# Patient Record
Sex: FEMALE | Race: WHITE | NOT HISPANIC OR LATINO | Employment: OTHER | URBAN - METROPOLITAN AREA
[De-identification: names, ages, dates, MRNs, and addresses within clinical notes are randomized per-mention and may not be internally consistent; named-entity substitution may affect disease eponyms.]

---

## 2017-05-25 ENCOUNTER — PREPPED CHART (OUTPATIENT)
Dept: URBAN - METROPOLITAN AREA CLINIC 36 | Facility: CLINIC | Age: 62
End: 2017-05-25

## 2017-08-14 NOTE — PATIENT DISCUSSION
BLEPHARITIS, OU: IMPROVING, INCREASE WARM COMPRESSES DAILY AND EYELID SCRUBS QD-BID, ARTIFICIAL TEARS BID-QID, THE DAILY INTAKE OF OMEGA-3 FATTY ACIDS .

## 2017-08-14 NOTE — PATIENT DISCUSSION
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 The resulting symptoms, including but not limited to visual field deficits and blepharitis. The patient understands that intervention is elective surgery and consultation with an oculoplastic specialist can be scheduled at their convenience.

## 2018-05-10 ASSESSMENT — VISUAL ACUITY
OD_CC: 20/20
OD_SC: J10
OS_CC: J1
OD_SC: CF 6FT
OS_SC: J10
OS_SC: 20/400
OS_CC: 20/20-2
OD_CC: J1

## 2018-05-10 ASSESSMENT — TONOMETRY
OS_IOP_MMHG: 18
OD_IOP_MMHG: 18

## 2018-06-25 ENCOUNTER — ESTABLISHED COMPREHENSIVE EXAM (OUTPATIENT)
Dept: URBAN - METROPOLITAN AREA CLINIC 36 | Facility: CLINIC | Age: 63
End: 2018-06-25

## 2018-06-25 DIAGNOSIS — H52.7: ICD-10-CM

## 2018-06-25 PROCEDURE — 92015 DETERMINE REFRACTIVE STATE: CPT

## 2018-06-25 PROCEDURE — 92014 COMPRE OPH EXAM EST PT 1/>: CPT

## 2018-06-25 ASSESSMENT — VISUAL ACUITY
OS_SC: 20/400
OS_CC: 20/20
OD_CC: J2
OD_CC: 20/20-1
OD_SC: <J10
OS_CC: J2
OS_SC: <J10
OD_SC: 20/400

## 2018-06-25 ASSESSMENT — TONOMETRY
OD_IOP_MMHG: 12
OS_IOP_MMHG: 16

## 2018-08-17 NOTE — PATIENT DISCUSSION
POSTERIOR VITREOUS DETACHMENT, OU: NO RETINAL HOLES OR TEARS ON SLIT LAMP AND INDIRECT OPHTHALMOSCOPY. RD PRECAUTIONS DISCUSSED. CALLBACK INSTRUCTIONS GIVEN. RETURN FOR FOLLOW-UP AS SCHEDULED.

## 2018-08-17 NOTE — PATIENT DISCUSSION
BLEPHARITIS, OU: IMPROVING, INCREASE WARM COMPRESSES DAILY AND EYELID SCRUBS QD-BID, ARTIFICIAL TEARS BID-QID, THE DAILY INTAKE OF OMEGA-3 FATTY ACIDS . PRESCRIBED EMYCIN THAI QHS PRN.

## 2018-08-17 NOTE — PATIENT DISCUSSION
New Prescription: erythromycin (erythromycin): ointment: 5 mg/gram (0.5 %) 1 a thin layer at bedtime into both eyes 08-

## 2019-07-03 ENCOUNTER — ESTABLISHED COMPREHENSIVE EXAM (OUTPATIENT)
Dept: URBAN - METROPOLITAN AREA CLINIC 36 | Facility: CLINIC | Age: 64
End: 2019-07-03

## 2019-07-03 DIAGNOSIS — H25.811: ICD-10-CM

## 2019-07-03 DIAGNOSIS — H52.7: ICD-10-CM

## 2019-07-03 DIAGNOSIS — H25.812: ICD-10-CM

## 2019-07-03 PROCEDURE — 92015 DETERMINE REFRACTIVE STATE: CPT

## 2019-07-03 PROCEDURE — 92014 COMPRE OPH EXAM EST PT 1/>: CPT

## 2019-07-03 ASSESSMENT — VISUAL ACUITY
OS_SC: >20/400
OD_CC: J1
OS_CC: J1
OD_SC: J2
OD_SC: CF 6FT
OD_CC: 20/25+1
OS_CC: 20/20-1
OS_SC: J2

## 2019-07-03 ASSESSMENT — TONOMETRY
OS_IOP_MMHG: 14
OD_IOP_MMHG: 12

## 2019-09-17 NOTE — PATIENT DISCUSSION
Continue: erythromycin (erythromycin): ointment: 5 mg/gram (0.5 %) 1 a thin layer at bedtime into both eyes 08-

## 2020-05-13 ENCOUNTER — ESTABLISHED COMPREHENSIVE EXAM (OUTPATIENT)
Dept: URBAN - METROPOLITAN AREA CLINIC 36 | Facility: CLINIC | Age: 65
End: 2020-05-13

## 2020-05-13 DIAGNOSIS — H25.812: ICD-10-CM

## 2020-05-13 DIAGNOSIS — H35.3131: ICD-10-CM

## 2020-05-13 DIAGNOSIS — H52.7: ICD-10-CM

## 2020-05-13 DIAGNOSIS — H25.811: ICD-10-CM

## 2020-05-13 PROCEDURE — 92014 COMPRE OPH EXAM EST PT 1/>: CPT

## 2020-05-13 PROCEDURE — 92015 DETERMINE REFRACTIVE STATE: CPT

## 2020-05-13 ASSESSMENT — TONOMETRY
OD_IOP_MMHG: 14
OS_IOP_MMHG: 14

## 2020-05-13 ASSESSMENT — VISUAL ACUITY
OS_SC: 20/400
OD_SC: J8
OD_CC: 20/20-2
OS_CC: 20/20
OD_SC: CF 6FT
OS_SC: J8
OD_CC: J3
OS_CC: J2

## 2020-09-11 NOTE — PATIENT DISCUSSION
BLEPHARITIS, OU: IMPROVING, INCREASE WARM COMPRESSES DAILY AND EYELID SCRUBS QD-BID, ARTIFICIAL TEARS BID-QID, THE DAILY INTAKE OF OMEGA-3 FATTY ACIDS . ADD EMYCIN THAI QHS.

## 2020-09-11 NOTE — PATIENT DISCUSSION
Stopped Today: erythromycin (erythromycin): ointment: 5 mg/gram (0.5 %) 1 a thin layer at bedtime into both eyes 08-

## 2021-01-05 ENCOUNTER — EST. PATIENT EMERGENCY (OUTPATIENT)
Dept: URBAN - METROPOLITAN AREA CLINIC 36 | Facility: CLINIC | Age: 66
End: 2021-01-05

## 2021-01-05 DIAGNOSIS — H57.89: ICD-10-CM

## 2021-01-05 DIAGNOSIS — H10.503: ICD-10-CM

## 2021-01-05 PROCEDURE — 92012 INTRM OPH EXAM EST PATIENT: CPT

## 2021-01-05 RX ORDER — NEOMYCIN SULFATE, POLYMYXIN B SULFATE AND DEXAMETHASONE 3.5; 10000; 1 MG/ML; [USP'U]/ML; MG/ML: 1 SUSPENSION OPHTHALMIC

## 2021-01-05 ASSESSMENT — VISUAL ACUITY
OD_CC: 20/20-1
OS_CC: 20/20-1

## 2021-01-05 ASSESSMENT — TONOMETRY
OD_IOP_MMHG: 13
OS_IOP_MMHG: 11

## 2021-05-18 ENCOUNTER — ESTABLISHED COMPREHENSIVE EXAM (OUTPATIENT)
Dept: URBAN - METROPOLITAN AREA CLINIC 36 | Facility: CLINIC | Age: 66
End: 2021-05-18

## 2021-05-18 DIAGNOSIS — H25.811: ICD-10-CM

## 2021-05-18 DIAGNOSIS — Z97.3: ICD-10-CM

## 2021-05-18 DIAGNOSIS — H04.123: ICD-10-CM

## 2021-05-18 DIAGNOSIS — H35.372: ICD-10-CM

## 2021-05-18 DIAGNOSIS — H52.7: ICD-10-CM

## 2021-05-18 DIAGNOSIS — H35.3131: ICD-10-CM

## 2021-05-18 DIAGNOSIS — H25.812: ICD-10-CM

## 2021-05-18 PROCEDURE — 92015 DETERMINE REFRACTIVE STATE: CPT

## 2021-05-18 PROCEDURE — 92014 COMPRE OPH EXAM EST PT 1/>: CPT

## 2021-05-18 ASSESSMENT — VISUAL ACUITY
OS_CC: 20/20-1
OS_CC: J1
OS_SC: 20/400
OD_SC: CF 5FT
OD_CC: J1
OD_CC: 20/20-1
OS_SC: J1
OD_SC: J1

## 2021-05-18 ASSESSMENT — TONOMETRY
OD_IOP_MMHG: 14
OS_IOP_MMHG: 14

## 2022-06-22 ENCOUNTER — COMPREHENSIVE EXAM (OUTPATIENT)
Dept: URBAN - METROPOLITAN AREA CLINIC 36 | Facility: CLINIC | Age: 67
End: 2022-06-22

## 2022-06-22 DIAGNOSIS — H52.7: ICD-10-CM

## 2022-06-22 DIAGNOSIS — H04.123: ICD-10-CM

## 2022-06-22 DIAGNOSIS — H35.372: ICD-10-CM

## 2022-06-22 DIAGNOSIS — H35.3131: ICD-10-CM

## 2022-06-22 DIAGNOSIS — H25.813: ICD-10-CM

## 2022-06-22 PROCEDURE — 92014 COMPRE OPH EXAM EST PT 1/>: CPT

## 2022-06-22 PROCEDURE — 92015 DETERMINE REFRACTIVE STATE: CPT

## 2022-06-22 ASSESSMENT — VISUAL ACUITY
OS_SC: J1
OS_CC: J1
OD_SC: CF 5FT
OS_CC: 20/20-1
OD_CC: J2
OD_CC: 20/20-1
OD_SC: J3
OS_SC: 20/400

## 2022-06-22 ASSESSMENT — TONOMETRY
OD_IOP_MMHG: 15
OS_IOP_MMHG: 15

## 2022-07-09 ENCOUNTER — TELEPHONE ENCOUNTER (OUTPATIENT)
Dept: URBAN - METROPOLITAN AREA CLINIC 121 | Facility: CLINIC | Age: 67
End: 2022-07-09

## 2022-07-09 RX ORDER — AMLODIPINE AND ATORVASTATIN 5; 10 MG/1; MG/1
TABLET, COATED ORAL ONCE A DAY
Refills: 0 | OUTPATIENT
Start: 2018-06-12 | End: 2018-07-20

## 2022-07-09 RX ORDER — MV-MIN/FOLIC/VIT K/LYCOP/COQ10 200-100MCG
CAPSULE ORAL ONCE A DAY
Refills: 0 | OUTPATIENT
Start: 2018-06-12 | End: 2018-07-20

## 2022-07-09 RX ORDER — MV-MIN/FOLIC/VIT K/LYCOP/COQ10 200-100MCG
CAPSULE ORAL ONCE A DAY
Refills: 0 | OUTPATIENT
Start: 2018-05-10 | End: 2018-06-12

## 2022-07-09 RX ORDER — CALCIUM CARBONATE 500 MG/1
TABLET, CHEWABLE ORAL ONCE A DAY
Refills: 0 | OUTPATIENT
Start: 2018-05-10 | End: 2018-06-12

## 2022-07-09 RX ORDER — KRILL/OMEGA-3/DHA/EPA/LIPIDS 500-115 MG
CAPSULE ORAL ONCE A DAY
Refills: 0 | OUTPATIENT
Start: 2018-06-12 | End: 2018-07-20

## 2022-07-09 RX ORDER — CALCIUM CARBONATE 500 MG/1
TABLET, CHEWABLE ORAL ONCE A DAY
Refills: 0 | OUTPATIENT
Start: 2018-06-12 | End: 2018-07-20

## 2022-07-09 RX ORDER — PSYLLIUM SEED (WITH DEXTROSE)
POWDER (GRAM) ORAL ONCE A DAY
Refills: 0 | OUTPATIENT
Start: 2018-07-20 | End: 2018-07-20

## 2022-07-09 RX ORDER — MV-MIN/FOLIC/VIT K/LYCOP/COQ10 200-100MCG
CAPSULE ORAL ONCE A DAY
Refills: 0 | OUTPATIENT
Start: 2018-07-20 | End: 2018-07-20

## 2022-07-09 RX ORDER — DOCOSAHEXAENOIC ACID 300 MG
CAPSULE ORAL ONCE A DAY
Refills: 0 | OUTPATIENT
Start: 2018-06-12 | End: 2018-07-20

## 2022-07-09 RX ORDER — KRILL/OMEGA-3/DHA/EPA/LIPIDS 500-115 MG
CAPSULE ORAL ONCE A DAY
Refills: 0 | OUTPATIENT
Start: 2018-05-10 | End: 2018-06-12

## 2022-07-09 RX ORDER — DOCOSAHEXAENOIC ACID 300 MG
CAPSULE ORAL ONCE A DAY
Refills: 0 | OUTPATIENT
Start: 2018-07-20 | End: 2018-07-20

## 2022-07-09 RX ORDER — CIPROFLOXACIN HCL 500 MG
BID TABLET ORAL
Refills: 0 | OUTPATIENT
Start: 2018-05-10 | End: 2018-06-12

## 2022-07-09 RX ORDER — AMLODIPINE AND ATORVASTATIN 5; 10 MG/1; MG/1
TABLET, COATED ORAL ONCE A DAY
Refills: 0 | OUTPATIENT
Start: 2018-07-20 | End: 2018-07-20

## 2022-07-09 RX ORDER — KRILL/OMEGA-3/DHA/EPA/LIPIDS 500-115 MG
CAPSULE ORAL ONCE A DAY
Refills: 0 | OUTPATIENT
Start: 2018-07-20 | End: 2018-07-20

## 2022-07-09 RX ORDER — DOCOSAHEXAENOIC ACID 300 MG
CAPSULE ORAL ONCE A DAY
Refills: 0 | OUTPATIENT
Start: 2018-05-10 | End: 2018-06-12

## 2022-07-09 RX ORDER — AMLODIPINE AND ATORVASTATIN 5; 10 MG/1; MG/1
TABLET, COATED ORAL ONCE A DAY
Refills: 0 | OUTPATIENT
Start: 2018-05-10 | End: 2018-06-12

## 2022-07-10 ENCOUNTER — TELEPHONE ENCOUNTER (OUTPATIENT)
Dept: URBAN - METROPOLITAN AREA CLINIC 121 | Facility: CLINIC | Age: 67
End: 2022-07-10

## 2022-07-10 RX ORDER — CALCIUM CARBONATE 500 MG/1
TABLET, CHEWABLE ORAL ONCE A DAY
Refills: 0 | Status: ACTIVE | COMMUNITY
Start: 2018-07-20

## 2022-07-10 RX ORDER — PSYLLIUM SEED (WITH DEXTROSE)
POWDER (GRAM) ORAL ONCE A DAY
Refills: 0 | Status: ACTIVE | COMMUNITY
Start: 2018-07-20

## 2022-07-10 RX ORDER — DOCOSAHEXAENOIC ACID 300 MG
CAPSULE ORAL ONCE A DAY
Refills: 0 | Status: ACTIVE | COMMUNITY
Start: 2018-07-20

## 2022-07-10 RX ORDER — AMLODIPINE AND ATORVASTATIN 5; 10 MG/1; MG/1
TABLET, COATED ORAL ONCE A DAY
Refills: 0 | Status: ACTIVE | COMMUNITY
Start: 2018-07-20

## 2022-07-10 RX ORDER — KRILL/OMEGA-3/DHA/EPA/LIPIDS 500-115 MG
CAPSULE ORAL ONCE A DAY
Refills: 0 | Status: ACTIVE | COMMUNITY
Start: 2018-07-20

## 2022-07-10 RX ORDER — MV-MIN/FOLIC/VIT K/LYCOP/COQ10 200-100MCG
CAPSULE ORAL ONCE A DAY
Refills: 0 | Status: ACTIVE | COMMUNITY
Start: 2018-07-20

## 2023-06-23 ENCOUNTER — COMPREHENSIVE EXAM (OUTPATIENT)
Dept: URBAN - METROPOLITAN AREA CLINIC 36 | Facility: CLINIC | Age: 68
End: 2023-06-23

## 2023-06-23 DIAGNOSIS — H35.3131: ICD-10-CM

## 2023-06-23 DIAGNOSIS — H52.7: ICD-10-CM

## 2023-06-23 DIAGNOSIS — H35.372: ICD-10-CM

## 2023-06-23 DIAGNOSIS — H25.813: ICD-10-CM

## 2023-06-23 DIAGNOSIS — H04.123: ICD-10-CM

## 2023-06-23 PROCEDURE — 92134 CPTRZ OPH DX IMG PST SGM RTA: CPT

## 2023-06-23 PROCEDURE — 92015 DETERMINE REFRACTIVE STATE: CPT

## 2023-06-23 PROCEDURE — 92014 COMPRE OPH EXAM EST PT 1/>: CPT

## 2023-06-23 ASSESSMENT — TONOMETRY
OS_IOP_MMHG: 11
OD_IOP_MMHG: 11

## 2023-06-23 ASSESSMENT — VISUAL ACUITY
OS_SC: 20/400
OD_CC: J1
OD_SC: CF 6FT
OD_CC: 20/20-1
OS_CC: 20/20-2
OS_CC: J1+

## 2024-06-25 ENCOUNTER — COMPREHENSIVE EXAM (OUTPATIENT)
Dept: URBAN - METROPOLITAN AREA CLINIC 36 | Facility: CLINIC | Age: 69
End: 2024-06-25

## 2024-06-25 DIAGNOSIS — H25.813: ICD-10-CM

## 2024-06-25 DIAGNOSIS — H52.7: ICD-10-CM

## 2024-06-25 DIAGNOSIS — H35.372: ICD-10-CM

## 2024-06-25 DIAGNOSIS — H04.123: ICD-10-CM

## 2024-06-25 DIAGNOSIS — H35.3131: ICD-10-CM

## 2024-06-25 PROCEDURE — 92134 CPTRZ OPH DX IMG PST SGM RTA: CPT

## 2024-06-25 PROCEDURE — 99215 OFFICE O/P EST HI 40 MIN: CPT

## 2024-06-25 PROCEDURE — 92015 DETERMINE REFRACTIVE STATE: CPT

## 2024-06-25 PROCEDURE — 92250 FUNDUS PHOTOGRAPHY W/I&R: CPT

## 2024-06-25 ASSESSMENT — TONOMETRY
OD_IOP_MMHG: 16
OS_IOP_MMHG: 16

## 2024-06-25 ASSESSMENT — VISUAL ACUITY
OD_SC: J8
OS_CC: J2
OD_CC: J3
OS_SC: J3
OS_CC: 20/25+2
OD_SC: 20/400
OS_SC: 20/400
OD_CC: 20/20-1

## 2025-05-30 NOTE — PATIENT DISCUSSION
Problem: Potential for Falls  Goal: Patient will remain free of falls  Description: INTERVENTIONS:  - Educate patient/family on patient safety including physical limitations  - Instruct patient to call for assistance with activity   - Consider consulting OT/PT to assist with strengthening/mobility based on AM PAC & JH-HLM score  - Consult OT/PT to assist with strengthening/mobility   - Keep Call bell within reach  - Keep bed low and locked with side rails adjusted as appropriate  - Keep care items and personal belongings within reach  - Initiate and maintain comfort rounds  - Make Fall Risk Sign visible to staff  - Offer Toileting every 2 Hours, in advance of need  - Initiate/Maintain bed alarm  - Obtain necessary fall risk management equipment: socks  - Apply yellow socks and bracelet for high fall risk patients  - Consider moving patient to room near nurses station  Outcome: Progressing     Problem: PAIN - ADULT  Goal: Verbalizes/displays adequate comfort level or baseline comfort level  Description: Interventions:  - Encourage patient to monitor pain and request assistance  - Assess pain using appropriate pain scale  - Administer analgesics as ordered based on type and severity of pain and evaluate response  - Implement non-pharmacological measures as appropriate and evaluate response  - Consider cultural and social influences on pain and pain management  - Notify physician/advanced practitioner if interventions unsuccessful or patient reports new pain  - Educate patient/family on pain management process including their role and importance of  reporting pain   - Provide non-pharmacologic/complimentary pain relief interventions  Outcome: Progressing     Problem: INFECTION - ADULT  Goal: Absence of fever/infection during neutropenic period  Description: INTERVENTIONS:  - Monitor WBC  - Perform strict hand hygiene  - Limit to healthy visitors only  - No plants, dried, fresh or silk flowers with perez in patient  COUNSELING: room  Outcome: Progressing     Problem: SAFETY ADULT  Goal: Maintain or return to baseline ADL function  Description: INTERVENTIONS:  -  Assess patient's ability to carry out ADLs; assess patient's baseline for ADL function and identify physical deficits which impact ability to perform ADLs (bathing, care of mouth/teeth, toileting, grooming, dressing, etc.)  - Assess/evaluate cause of self-care deficits   - Assess range of motion  - Assess patient's mobility; develop plan if impaired  - Assess patient's need for assistive devices and provide as appropriate  - Encourage maximum independence but intervene and supervise when necessary  - Involve family in performance of ADLs  - Assess for home care needs following discharge   - Consider OT consult to assist with ADL evaluation and planning for discharge  - Provide patient education as appropriate  - Monitor functional capacity and physical performance, use of AM PAC & JH-HLM   - Monitor gait, balance and fatigue with ambulation    Outcome: Progressing     Problem: Knowledge Deficit  Goal: Patient/family/caregiver demonstrates understanding of disease process, treatment plan, medications, and discharge instructions  Description: Complete learning assessment and assess knowledge base.  Interventions:  - Provide teaching at level of understanding  - Provide teaching via preferred learning methods  Outcome: Progressing     Problem: Nutrition/Hydration-ADULT  Goal: Nutrient/Hydration intake appropriate for improving, restoring or maintaining nutritional needs  Description: Monitor and assess patient's nutrition/hydration status for malnutrition. Collaborate with interdisciplinary team and initiate plan and interventions as ordered.  Monitor patient's weight and dietary intake as ordered or per policy. Utilize nutrition screening tool and intervene as necessary. Determine patient's food preferences and provide high-protein, high-caloric foods as appropriate.      INTERVENTIONS:  - Monitor oral intake, urinary output, labs, and treatment plans  - Assess nutrition and hydration status and recommend course of action  - Evaluate amount of meals eaten  - Assist patient with eating if necessary   - Allow adequate time for meals  - Recommend/ encourage appropriate diets, oral nutritional supplements, and vitamin/mineral supplements  - Order, calculate, and assess calorie counts as needed  - Recommend, monitor, and adjust tube feedings and TPN/PPN based on assessed needs  - Assess need for intravenous fluids  - Provide specific nutrition/hydration education as appropriate  - Include patient/family/caregiver in decisions related to nutrition  Outcome: Progressing

## 2025-07-31 ENCOUNTER — COMPREHENSIVE EXAM (OUTPATIENT)
Age: 70
End: 2025-07-31

## 2025-07-31 DIAGNOSIS — H35.3131: ICD-10-CM

## 2025-07-31 DIAGNOSIS — H52.7: ICD-10-CM

## 2025-07-31 DIAGNOSIS — H35.372: ICD-10-CM

## 2025-07-31 DIAGNOSIS — H04.123: ICD-10-CM

## 2025-07-31 DIAGNOSIS — H25.813: ICD-10-CM

## 2025-07-31 PROCEDURE — 92012 INTRM OPH EXAM EST PATIENT: CPT

## 2025-07-31 PROCEDURE — 92250 FUNDUS PHOTOGRAPHY W/I&R: CPT

## 2025-07-31 PROCEDURE — 92015 DETERMINE REFRACTIVE STATE: CPT

## 2025-07-31 PROCEDURE — 92134 CPTRZ OPH DX IMG PST SGM RTA: CPT
